# Patient Record
Sex: FEMALE | Race: ASIAN | NOT HISPANIC OR LATINO
[De-identification: names, ages, dates, MRNs, and addresses within clinical notes are randomized per-mention and may not be internally consistent; named-entity substitution may affect disease eponyms.]

---

## 2023-01-10 ENCOUNTER — APPOINTMENT (OUTPATIENT)
Dept: ORTHOPEDIC SURGERY | Facility: CLINIC | Age: 68
End: 2023-01-10

## 2023-01-13 ENCOUNTER — APPOINTMENT (OUTPATIENT)
Dept: ORTHOPEDIC SURGERY | Facility: CLINIC | Age: 68
End: 2023-01-13
Payer: MEDICARE

## 2023-01-13 VITALS — WEIGHT: 213 LBS | HEIGHT: 65 IN | BODY MASS INDEX: 35.49 KG/M2

## 2023-01-13 DIAGNOSIS — E11.9 TYPE 2 DIABETES MELLITUS W/OUT COMPLICATIONS: ICD-10-CM

## 2023-01-13 PROCEDURE — 99204 OFFICE O/P NEW MOD 45 MIN: CPT | Mod: 25

## 2023-01-13 PROCEDURE — 20610 DRAIN/INJ JOINT/BURSA W/O US: CPT | Mod: 50

## 2023-01-13 PROCEDURE — 73562 X-RAY EXAM OF KNEE 3: CPT | Mod: 50

## 2023-01-13 NOTE — DISCUSSION/SUMMARY
[de-identified] : \par discused options.. plan for CSI bilatearl knees. PT. \par She would ultimately benefit from BILAT TKA, in order to have TKA, her bld sugars and weight need to be optimized.  \par ----------------------------------------------------------------------------\par \par The patient was advised of the diagnosis.  The natural history of the pathology was explained in full. All questions were answered.  The risks and benefits of conservative and interventional treatment alternatives were explained to the patient\par \par ----------------------------------------------------------------------------\par \par Due to patient BMI, a weight loss discussion was had with the patient. Patient states understanding. Start PT.\par \par ----------------------------------------------------------------------------\par \par Patient warned of specific risks of medication related to bleeding, GI issues, increase blood pressure, and cardiac risks in addition to additional risks.  Patient advised to discuss with PMD  if any presence of stated issues.\par \par ----------------------------------------------------------------------------\par Benefits of visco reviewed. Will do CSI today.  Stressed importance of monitoring for elevated bld sugars. She is aware they will increase post injection.\par \par ----------------------------------------------------------------------------\par \par Large joint corticosteroid injection given: Bilateral knees\par \par Patient indicated for injection after trial of rest, OTC medications including aspirin, Ibuprofen, Aleve etc or prescription NSAIDS, and/or exercises at home and/ or physical therapy without satisfactory response.  Patient has symptoms including pain, swelling, and/or decreased mobility in the joint. The risks, benefits, and alternatives to corticosteroid injection were explained in full to the patient, including but not limited to infection, sepsis, bleeding, scarring, skin discoloration, temporary increase in pain, syncopal episode, failure to resolve symptoms, allergic reaction, symptom recurrence, and elevation of blood sugar in diabetics. Patient understood the risks. All questions were answered. After discussion of options, patient requested an injection. \par \par Oral informed consent was obtained and sterile technique was utilized for the procedure including the preparation of the solutions used for the injection and betadine followed by alcohol prep to the injection site. Anesthesia was given with ethyl chloride sprayed topically. The injection was delivered. Patient tolerated the procedure well. \par \par Post Procedure Instructions: Patient was advised to call if redness, pain, or fever occur and apply ice for 15 min on and 15 min off later today\par \par Medications delivered: Kenalog 10 mg, Lidocaine: 4 cc in each knee\par

## 2023-01-13 NOTE — IMAGING
[Bilateral] : knee bilaterally [AP] : anteroposterior [Lateral] : lateral [[place]] : skyline [Outside films reviewed] : Outside films reviewed [Degenerative change] : Degenerative change [de-identified] : \par ----------------------------------------------------------------------------\par \par Bilateral knee exam: \par large body habitus, using Rollator\par Inspection: \par    (-) Effusion\par    (-) Malalignment\par    (-) Swelling\par    (-) Quad atrophy\par    (-) J-sign\par ROM: \par    0 - 135 degrees of flexion. Anterior pain with extension\par Tenderness: \par    (-) MJLT\par    (-) LJLT\par    (+  Medial patellar facet tenderness\par    (+) Lateral patellar facet tenderness\par    (+) Crepitus\par    (+) Patellar grind tenderness\par    (-) Patellar tendon\par    (-) Quad tendon\par    Other: + anterior, patella anterior \par Stability: \par    (-) Lachman\par    (-) Varus/Valgus instability\par    (-) Posterior drawer\par    (-) Patellar translation: wnl\par Additional tests: \par    (-) McMurrays test\par    (-) Patellar apprehension\par    Other: \par Strength: 4/5 Q/H/TA/GS/EHL\par Neuro: In tact to light touch throughout, DTR's wnl\par Vascularity: Extremity warm and well perfused\par Gait: normal\par \par  [FreeTextEntry9] : Advanced PF, medial compartment OA, moderate lateral compartment, left lateral patella tilt, traction spur

## 2023-01-13 NOTE — HISTORY OF PRESENT ILLNESS
[Left Leg] : left leg [Right Leg] : right leg [Sudden] : sudden [8] : 8 [Sharp] : sharp [Frequent] : frequent [Meds] : meds [Lying in bed] : lying in bed [de-identified] : This is Ms. DAVID LOTT  a 67 year old female who comes in today complaining of bilateral knee pain since having a few falls.  noted to have 2 falls in [place]. notes that she had an injection in the right knee in the past maybe a steroid.  [] : no [FreeTextEntry9] : ibuprofen/tramadol

## 2023-03-10 ENCOUNTER — APPOINTMENT (OUTPATIENT)
Dept: ORTHOPEDIC SURGERY | Facility: CLINIC | Age: 68
End: 2023-03-10

## 2023-10-27 ENCOUNTER — APPOINTMENT (OUTPATIENT)
Dept: ORTHOPEDIC SURGERY | Facility: CLINIC | Age: 68
End: 2023-10-27
Payer: MEDICARE

## 2023-10-27 VITALS — HEIGHT: 65 IN | BODY MASS INDEX: 35.49 KG/M2 | WEIGHT: 213 LBS

## 2023-10-27 PROCEDURE — 99214 OFFICE O/P EST MOD 30 MIN: CPT | Mod: 25

## 2023-10-27 PROCEDURE — 20610 DRAIN/INJ JOINT/BURSA W/O US: CPT | Mod: 50

## 2023-10-27 RX ORDER — DICLOFENAC SODIUM 75 MG/1
75 TABLET, DELAYED RELEASE ORAL
Qty: 30 | Refills: 0 | Status: ACTIVE | COMMUNITY
Start: 2023-10-27 | End: 1900-01-01

## 2023-12-08 ENCOUNTER — APPOINTMENT (OUTPATIENT)
Dept: ORTHOPEDIC SURGERY | Facility: CLINIC | Age: 68
End: 2023-12-08
Payer: MEDICARE

## 2023-12-08 VITALS — WEIGHT: 213 LBS | BODY MASS INDEX: 35.49 KG/M2 | HEIGHT: 65 IN

## 2023-12-08 PROCEDURE — 99213 OFFICE O/P EST LOW 20 MIN: CPT | Mod: 25

## 2023-12-08 PROCEDURE — 20610 DRAIN/INJ JOINT/BURSA W/O US: CPT | Mod: 50

## 2023-12-14 ENCOUNTER — APPOINTMENT (OUTPATIENT)
Dept: ORTHOPEDIC SURGERY | Facility: CLINIC | Age: 68
End: 2023-12-14
Payer: MEDICARE

## 2023-12-14 PROCEDURE — 20610 DRAIN/INJ JOINT/BURSA W/O US: CPT | Mod: 50

## 2023-12-14 PROCEDURE — 99024 POSTOP FOLLOW-UP VISIT: CPT

## 2023-12-14 NOTE — IMAGING
[Bilateral] : knee bilaterally [AP] : anteroposterior [Lateral] : lateral [[place]] : skyline [Outside films reviewed] : Outside films reviewed [Degenerative change] : Degenerative change [de-identified] : ----------------------------------------------------------------------------  Bilateral knee exam:  large body habitus Inspection:     (-) Effusion    (-) Malalignment    (-) Swelling    (-) Quad atrophy    (-) J-sign ROM:     0 - 135 degrees of flexion. Anterior pain with extension Tenderness:     (-) MJLT    (-) LJLT    (+  Medial patellar facet tenderness    (+) Lateral patellar facet tenderness    (+) Crepitus    (+) Patellar grind tenderness    (-) Patellar tendon    (-) Quad tendon    Other: + anterior, patella anterior  Stability:     (-) Lachman    (-) Varus/Valgus instability    (-) Posterior drawer    (-) Patellar translation: wnl Additional tests:     (-) McMurrays test    (-) Patellar apprehension    Other:  Strength: 4/5 Q/H/TA/GS/EHL Neuro: In tact to light touch throughout, DTR's wnl Vascularity: Extremity warm and well perfused Gait: mildly antalgic   [FreeTextEntry9] : Advanced PF, medial compartment OA, moderate lateral compartment, left lateral patella tilt, traction spur

## 2023-12-14 NOTE — HISTORY OF PRESENT ILLNESS
[Left Leg] : left leg [Right Leg] : right leg [Sudden] : sudden [6] : 6 [3] : 3 [Dull/Aching] : dull/aching [Sharp] : sharp [Frequent] : frequent [Rest] : rest [Meds] : meds [Lying in bed] : lying in bed [2] : 2 [Euflexxa] : Euflexxa [de-identified] : This is Ms. DAVID LOTT  a 67 year old female who comes in today complaining of bilateral knee pain since having a few falls.  noted to have 2 falls in [place]. notes that she had an injection in the right knee in the past maybe a steroid.  [] : Post Surgical Visit: no [FreeTextEntry9] : ibuprofen/tramadol [de-identified] : none  [de-identified] : 12/08/2023 [de-identified] : Both knees

## 2023-12-14 NOTE — DISCUSSION/SUMMARY
[de-identified] : She would ultimately benefit from BILAT TKA, in order to have TKA, her bld sugars and weight need to be optimized.   Euflexxa bl knee #2 Euflexxa dose: 20 mg/2ml injection intraarticularly in each noted joint  fu 1 wk  ----------------------------------------------------------------------------  All relevant imaging studies pertinent to today's visit, including x-rays, MRI's and/or other advanced imaging studies (CT/etc) were independently interpreted and reviewed with the patient as needed. Implications of the studies together with the patient's clinical picture were discussed to formulate a working diagnosis and management options were detailed.  The patient was advised of the diagnosis.  The natural history of the pathology was explained in full. All questions were answered.  The risks and benefits of conservative and interventional treatment alternatives were explained to the patient   ----------------------------------------------------------------------------  Due to patient BMI, a weight loss discussion was had with the patient. Patient states understanding. Start PT.  ----------------------------------------------------------------------------  Patient warned of specific risks of medication related to bleeding, GI issues, increase blood pressure, and cardiac risks in addition to additional risks.  Patient advised to discuss with PMD  if any presence of stated issues.   ----------------------------------------------------------------------------  Large joint injection given: Hyaluronic acid/viscosupplementation to Bilateral knee  Viscosupplementation injection indications at this time include- X-ray evidence of osteoarthritis on this or prior visits, and patient having tried OTC's including aspirin, Ibuprofen, Aleve etc or prescription NSAIDS, and/or exercises at home and/ or physical therapy without satisfactory response.  The risks, benefits, and alternatives to viscosupplementation injection were explained in full to the patient. Risks outlined include but are not limited to infection, sepsis, bleeding, scarring, skin discoloration, temporary increase in pain, syncopal episode, failure to resolve symptoms, allergic reaction, and symptom recurrence. Patient understood the risks. All questions were answered. After discussion of options, the patient requested viscosupplementation.   An injection of Hyaluronic acid of appropriate formulation was injected into the joint(s) after verbal consent, using sterile technique. The patient tolerated the procedure well and there were no complications. Instructed patient to apply ice to the injection site. Signs and symptoms of infection reviewed and patient advised to call immediately for redness, fevers, and/or chills.    Progress note completed by Al Elder PA-C working as a scribe for Dr Monsivais

## 2023-12-22 ENCOUNTER — APPOINTMENT (OUTPATIENT)
Dept: ORTHOPEDIC SURGERY | Facility: CLINIC | Age: 68
End: 2023-12-22
Payer: MEDICARE

## 2023-12-22 VITALS — HEIGHT: 65 IN | WEIGHT: 213 LBS | BODY MASS INDEX: 35.49 KG/M2

## 2023-12-22 PROCEDURE — 20610 DRAIN/INJ JOINT/BURSA W/O US: CPT | Mod: 50

## 2023-12-22 PROCEDURE — 99024 POSTOP FOLLOW-UP VISIT: CPT

## 2023-12-22 NOTE — HISTORY OF PRESENT ILLNESS
[Left Leg] : left leg [Right Leg] : right leg [Sudden] : sudden [4] : 4 [1] : 2 [Dull/Aching] : dull/aching [Sharp] : sharp [Frequent] : frequent [Rest] : rest [Meds] : meds [Lying in bed] : lying in bed [2] : 2 [Euflexxa] : Euflexxa [de-identified] : This is Ms. DAVID LOTT  a 67 year old female who comes in today complaining of bilateral knee pain since having a few falls.  noted to have 2 falls in [place]. notes that she had an injection in the right knee in the past maybe a steroid.  [] : Post Surgical Visit: no [FreeTextEntry9] : ibuprofen/tramadol [de-identified] : none  [de-identified] : 12/08/2023 [de-identified] : Both knees

## 2023-12-22 NOTE — IMAGING
[Bilateral] : knee bilaterally [AP] : anteroposterior [Lateral] : lateral [[place]] : skyline [Outside films reviewed] : Outside films reviewed [Degenerative change] : Degenerative change [de-identified] : ----------------------------------------------------------------------------  Bilateral knee exam:  large body habitus Inspection:     (-) Effusion    (-) Malalignment    (-) Swelling    (-) Quad atrophy    (-) J-sign ROM:     0 - 135 degrees of flexion. Anterior pain with extension Tenderness:     (-) MJLT    (-) LJLT    (+  Medial patellar facet tenderness    (+) Lateral patellar facet tenderness    (+) Crepitus    (+) Patellar grind tenderness    (-) Patellar tendon    (-) Quad tendon    Other: + anterior, patella anterior  Stability:     (-) Lachman    (-) Varus/Valgus instability    (-) Posterior drawer    (-) Patellar translation: wnl Additional tests:     (-) McMurrays test    (-) Patellar apprehension    Other:  Strength: 4/5 Q/H/TA/GS/EHL Neuro: In tact to light touch throughout, DTR's wnl Vascularity: Extremity warm and well perfused Gait: mildly antalgic   [FreeTextEntry9] : Advanced PF, medial compartment OA, moderate lateral compartment, left lateral patella tilt, traction spur

## 2023-12-22 NOTE — DISCUSSION/SUMMARY
[de-identified] : She would ultimately benefit from BILAT TKA, in order to have TKA, her blood sugars and weight need to be optimized.   Euflexxa bl knee #3 Euflexxa dose: 20 mg/2ml injection intraarticularly in each noted joint  fu 1 wk  ----------------------------------------------------------------------------  All relevant imaging studies pertinent to today's visit, including x-rays, MRI's and/or other advanced imaging studies (CT/etc) were independently interpreted and reviewed with the patient as needed. Implications of the studies together with the patient's clinical picture were discussed to formulate a working diagnosis and management options were detailed.  The patient was advised of the diagnosis.  The natural history of the pathology was explained in full. All questions were answered.  The risks and benefits of conservative and interventional treatment alternatives were explained to the patient   ----------------------------------------------------------------------------  Due to patient BMI, a weight loss discussion was had with the patient. Patient states understanding.   ----------------------------------------------------------------------------  Patient warned of specific risks of medication related to bleeding, GI issues, increase blood pressure, and cardiac risks in addition to additional risks.  Patient advised to discuss with PMD  if any presence of stated issues.   ----------------------------------------------------------------------------  Large joint injection given: Hyaluronic acid/viscosupplementation to Bilateral knee  Viscosupplementation injection indications at this time include- X-ray evidence of osteoarthritis on this or prior visits, and patient having tried OTC's including aspirin, Ibuprofen, Aleve etc or prescription NSAIDS, and/or exercises at home and/ or physical therapy without satisfactory response.  The risks, benefits, and alternatives to viscosupplementation injection were explained in full to the patient. Risks outlined include but are not limited to infection, sepsis, bleeding, scarring, skin discoloration, temporary increase in pain, syncopal episode, failure to resolve symptoms, allergic reaction, and symptom recurrence. Patient understood the risks. All questions were answered. After discussion of options, the patient requested viscosupplementation.   An injection of Hyaluronic acid of appropriate formulation was injected into the joint(s) after verbal consent, using sterile technique. The patient tolerated the procedure well and there were no complications. Instructed patient to apply ice to the injection site. Signs and symptoms of infection reviewed and patient advised to call immediately for redness, fevers, and/or chills.

## 2024-02-08 ENCOUNTER — APPOINTMENT (OUTPATIENT)
Dept: ORTHOPEDIC SURGERY | Facility: CLINIC | Age: 69
End: 2024-02-08
Payer: MEDICARE

## 2024-02-08 DIAGNOSIS — M54.16 RADICULOPATHY, LUMBAR REGION: ICD-10-CM

## 2024-02-08 PROCEDURE — 99214 OFFICE O/P EST MOD 30 MIN: CPT

## 2024-02-08 PROCEDURE — 72100 X-RAY EXAM L-S SPINE 2/3 VWS: CPT

## 2024-02-08 PROCEDURE — 72170 X-RAY EXAM OF PELVIS: CPT

## 2024-02-08 RX ORDER — METHYLPREDNISOLONE 4 MG/1
4 TABLET ORAL
Qty: 1 | Refills: 0 | Status: ACTIVE | COMMUNITY
Start: 2024-02-08 | End: 1900-01-01

## 2024-02-08 NOTE — HISTORY OF PRESENT ILLNESS
[Left Leg] : left leg [Right Leg] : right leg [Sudden] : sudden [4] : 4 [1] : 2 [Dull/Aching] : dull/aching [Sharp] : sharp [Frequent] : frequent [Rest] : rest [Meds] : meds [Lying in bed] : lying in bed [2] : 2 [Euflexxa] : Euflexxa [de-identified] : This is Ms. DAVID LOTT  a 67 year old female who comes in today complaining of bilateral knee pain. she wants to know about cortisone shots or if she is able to restart her series of euflexxa.  Recent a1c 6.1 [] : Post Surgical Visit: no [FreeTextEntry9] : ibuprofen/tramadol [de-identified] : none  [de-identified] : 12/08/2023 [de-identified] : Both knees

## 2024-02-08 NOTE — IMAGING
[Bilateral] : knee bilaterally [Lateral] : lateral [[place]] : skyline [Outside films reviewed] : Outside films reviewed [Degenerative change] : Degenerative change [de-identified] : ----------------------------------------------------------------------------  Bilateral knee exam:  large body habitus Inspection:     (-) Effusion    (-) Malalignment    (-) Swelling    (-) Quad atrophy    (-) J-sign ROM:     0 - 135 degrees of flexion. Anterior pain with extension Tenderness:     (+) MJLT    (-) LJLT    (+  Medial patellar facet tenderness    (+) Lateral patellar facet tenderness    (+) Crepitus    (+) Patellar grind tenderness    (-) Patellar tendon    (-) Quad tendon    Other: + anterior, patella anterior  Stability:     (-) Lachman    (-) Varus/Valgus instability    (-) Posterior drawer    (-) Patellar translation: wnl Additional tests:     (-) McMurrays test    (-) Patellar apprehension    Other:  Strength: 4/5 Q/H/TA/GS/EHL Neuro: In tact to light touch throughout, DTR's wnl Vascularity: Extremity warm and well perfused Gait: mildly antalgic   ----------------------------------------------------------------------------  Thoracic/Lumbar spine exam:   Inspection:    (+ Abnormal alignment (kyphosis)   (-) Atrophy  ROM:     Pain:           (-) Flexion/extension     (-) Rotation    Stiffness:   (+) Flexion/extension     (+) Rotation                       (+) Hamstring tightness Tenderness/Spasm:               Lumbar paraspinal:          (+) Right    (+) Left    (-) Midline     Thoracic paraspinal:        (-) Right    (-) Left    (-) Midline    PSIS:                                (-) Right    (-) Left    SI joint:                             (-) Right    (-) Left    Greater troch:                  (-) Right    (-) Left Strength: (out of 5)    Illiopsoas:           Right: 5   .    Left: 5    Quad:                 Right: 5   .    Left: 5    Hamstrings:        Right: 5   .    Left: 5    Anterior tibialis:  Right: 5    .   Left: 5    Gastrocsoleus:  Right: 5    .   Left: 5    EHL:                    Right: 5    .   Left: 5 Neuro: DTR's wnl.  Sensation to light touch grossly in tact in all distributions.     (-) SLR    (-) Femoral stretch   [Facet arthropathy] : Facet arthropathy [Disc space narrowing] : Disc space narrowing [Spondylolithesis] : Spondylolithesis [AP] : anteroposterior [Moderate arthritis ([place] Grade 2)] : Moderate arthritis ([place] Grade 2) [FreeTextEntry9] : Advanced PF, medial compartment OA, moderate lateral compartment, left lateral patella tilt, traction spur

## 2024-02-08 NOTE — DISCUSSION/SUMMARY
[de-identified] : having new burning pain around the right knee, lumbar radic vs knee oa MDP rx  PT rx discussed csi to knee if sx persist fu prn ----------------------------------------------------------------------------  All relevant imaging studies pertinent to today's visit, including x-rays, MRI's and/or other advanced imaging studies (CT/etc) were independently interpreted and reviewed with the patient as needed. Implications of the studies together with the patient's clinical picture were discussed to formulate a working diagnosis and management options were detailed.  The patient was advised of the diagnosis.  The natural history of the pathology was explained in full. All questions were answered.  The risks and benefits of conservative and interventional treatment alternatives were explained to the patient  ----------------------------------------------------------------------------  Patient was advised that steroid medications may result in increased blood sugars, and given patient's history of diabetes, blood sugars should be monitored closely, where applicable, for 5-10 days during and following steroid medication intake orally or through injection.

## 2024-02-26 ENCOUNTER — APPOINTMENT (OUTPATIENT)
Dept: ORTHOPEDIC SURGERY | Facility: CLINIC | Age: 69
End: 2024-02-26

## 2024-02-26 ENCOUNTER — APPOINTMENT (OUTPATIENT)
Dept: ORTHOPEDIC SURGERY | Facility: CLINIC | Age: 69
End: 2024-02-26
Payer: MEDICARE

## 2024-02-26 VITALS — WEIGHT: 213 LBS | HEIGHT: 65 IN | BODY MASS INDEX: 35.49 KG/M2

## 2024-02-26 DIAGNOSIS — M25.562 PAIN IN RIGHT KNEE: ICD-10-CM

## 2024-02-26 DIAGNOSIS — M25.561 PAIN IN RIGHT KNEE: ICD-10-CM

## 2024-02-26 DIAGNOSIS — M17.0 BILATERAL PRIMARY OSTEOARTHRITIS OF KNEE: ICD-10-CM

## 2024-02-26 PROCEDURE — 20610 DRAIN/INJ JOINT/BURSA W/O US: CPT | Mod: 50

## 2024-02-26 PROCEDURE — J3490M: CUSTOM | Mod: NC

## 2024-02-26 PROCEDURE — 99214 OFFICE O/P EST MOD 30 MIN: CPT | Mod: 25

## 2024-03-08 ENCOUNTER — APPOINTMENT (OUTPATIENT)
Dept: ORTHOPEDIC SURGERY | Facility: CLINIC | Age: 69
End: 2024-03-08

## 2024-03-25 ENCOUNTER — APPOINTMENT (OUTPATIENT)
Dept: ORTHOPEDIC SURGERY | Facility: CLINIC | Age: 69
End: 2024-03-25

## 2024-09-16 ENCOUNTER — APPOINTMENT (OUTPATIENT)
Dept: ORTHOPEDIC SURGERY | Facility: CLINIC | Age: 69
End: 2024-09-16
Payer: MEDICARE

## 2024-09-16 VITALS — HEIGHT: 65 IN | BODY MASS INDEX: 35.49 KG/M2 | WEIGHT: 213 LBS

## 2024-09-16 DIAGNOSIS — M17.0 BILATERAL PRIMARY OSTEOARTHRITIS OF KNEE: ICD-10-CM

## 2024-09-16 PROCEDURE — 20610 DRAIN/INJ JOINT/BURSA W/O US: CPT | Mod: 50

## 2024-09-16 PROCEDURE — 99213 OFFICE O/P EST LOW 20 MIN: CPT | Mod: 25

## 2024-09-16 NOTE — DISCUSSION/SUMMARY
[de-identified] : Discussed PT, CSI, HA, TKA PT rx Plan for CSI b/l knees fu 6-8 weeks, prn ----------------------------------------------------------------------------  Large joint corticosteroid injection given: Bilateral knees  Patient indicated for injection after trial of rest, OTC medications including aspirin, Ibuprofen, Aleve etc or prescription NSAIDS, and/or exercises at home and/ or physical therapy without satisfactory response.  Patient has symptoms including pain, swelling, and/or decreased mobility in the joint. The risks, benefits, and alternatives to corticosteroid injection were explained in full to the patient, including but not limited to infection, sepsis, bleeding, scarring, skin discoloration, temporary increase in pain, syncopal episode, failure to resolve symptoms, allergic reaction, symptom recurrence, and elevation of blood sugar in diabetics. Patient understood the risks. All questions were answered. After discussion of options, patient requested an injection.   Oral informed consent was obtained and sterile technique was utilized for the procedure including the preparation of the solutions used for the injection and betadine followed by alcohol prep to the injection site. Anesthesia was given with ethyl chloride sprayed topically. The injection was delivered. Patient tolerated the procedure well.   Post Procedure Instructions: Patient was advised to call if redness, pain, or fever occur and apply ice for 15 min on and 15 min off later today  Medications delivered: Kenalog 10 mg, Lidocaine: 4 cc in each knee ----------------------------------------------------------------------------  All relevant imaging studies pertinent to today's visit, including x-rays, MRI's and/or other advanced imaging studies (CT/etc) were independently interpreted and reviewed with the patient as needed. Implications of the studies together with the patient's clinical picture were discussed to formulate a working diagnosis and management options were detailed.  The patient was advised of the diagnosis.  The natural history of the pathology was explained in full. All questions were answered.  The risks and benefits of conservative and interventional treatment alternatives were explained to the patient  ----------------------------------------------------------------------------  Patient was advised that steroid medications may result in increased blood sugars, and given patient's history of diabetes, blood sugars should be monitored closely, where applicable, for 5-10 days during and following steroid medication intake orally or through injection.

## 2024-09-16 NOTE — HISTORY OF PRESENT ILLNESS
[Left Leg] : left leg [Right Leg] : right leg [Sudden] : sudden [8] : 8 [Dull/Aching] : dull/aching [Sharp] : sharp [Frequent] : frequent [Rest] : rest [Meds] : meds [Lying in bed] : lying in bed [2] : 2 [Euflexxa] : Euflexxa [de-identified] : This is Ms. DAVID LOTT  a 67 year old female who comes in today complaining of bilateral knee pain. she wants to know about cortisone shots or if she is able to restart her series of euflexxa.  Recent a1c 6.1 [] : Post Surgical Visit: no [FreeTextEntry9] : ibuprofen/tramadol [de-identified] : none  [de-identified] : 12/08/2023 [de-identified] : Both knees

## 2024-09-16 NOTE — IMAGING
[Facet arthropathy] : Facet arthropathy [Disc space narrowing] : Disc space narrowing [Spondylolithesis] : Spondylolithesis [Moderate arthritis (Tonnis Grade 2)] : Moderate arthritis (Tonnis Grade 2) [Bilateral] : knee bilaterally [AP] : anteroposterior [Lateral] : lateral [[place]] : skyline [Outside films reviewed] : Outside films reviewed [Degenerative change] : Degenerative change [de-identified] : ----------------------------------------------------------------------------  Bilateral knee exam:  large body habitus Inspection:     (-) Effusion    (-) Malalignment    (-) Swelling    (-) Quad atrophy    (-) J-sign ROM:     0 - 135 degrees of flexion. Anterior pain with extension Tenderness:     (+) MJLT    (-) LJLT    (+  Medial patellar facet tenderness    (+) Lateral patellar facet tenderness    (+) Crepitus    (+) Patellar grind tenderness    (-) Patellar tendon    (-) Quad tendon    Other: + anterior, patella anterior  Stability:     (-) Lachman    (-) Varus/Valgus instability    (-) Posterior drawer    (-) Patellar translation: wnl Additional tests:     (-) McMurrays test    (-) Patellar apprehension    Other:  Strength: 4/5 Q/H/TA/GS/EHL Neuro: In tact to light touch throughout, DTR's wnl Vascularity: Extremity warm and well perfused Gait: mildly antalgic   ----------------------------------------------------------------------------  Thoracic/Lumbar spine exam:   Inspection:    (+ Abnormal alignment (kyphosis)   (-) Atrophy  ROM:     Pain:           (-) Flexion/extension     (-) Rotation    Stiffness:   (+) Flexion/extension     (+) Rotation                       (+) Hamstring tightness Tenderness/Spasm:               Lumbar paraspinal:          (+) Right    (+) Left    (-) Midline     Thoracic paraspinal:        (-) Right    (-) Left    (-) Midline    PSIS:                                (-) Right    (-) Left    SI joint:                             (-) Right    (-) Left    Greater troch:                  (-) Right    (-) Left Strength: (out of 5)    Illiopsoas:           Right: 5   .    Left: 5    Quad:                 Right: 5   .    Left: 5    Hamstrings:        Right: 5   .    Left: 5    Anterior tibialis:  Right: 5    .   Left: 5    Gastrocsoleus:  Right: 5    .   Left: 5    EHL:                    Right: 5    .   Left: 5 Neuro: DTR's wnl.  Sensation to light touch grossly in tact in all distributions.     (-) SLR    (-) Femoral stretch   [[email or web address]] : Advanced PF, medial compartment OA, moderate lateral compartment, left lateral patella tilt, traction spur